# Patient Record
Sex: FEMALE | Race: WHITE | Employment: UNEMPLOYED | URBAN - METROPOLITAN AREA
[De-identification: names, ages, dates, MRNs, and addresses within clinical notes are randomized per-mention and may not be internally consistent; named-entity substitution may affect disease eponyms.]

---

## 2018-04-23 PROBLEM — J30.1 SEASONAL ALLERGIC RHINITIS DUE TO POLLEN: Status: ACTIVE | Noted: 2018-04-23

## 2018-04-23 PROBLEM — F03.90 DEMENTIA WITHOUT BEHAVIORAL DISTURBANCE (HCC): Status: ACTIVE | Noted: 2018-04-23

## 2018-04-23 PROBLEM — E78.00 HYPERCHOLESTEROLEMIA: Status: ACTIVE | Noted: 2018-04-23

## 2018-07-06 PROBLEM — R13.10 DYSPHAGIA: Status: ACTIVE | Noted: 2018-07-06

## 2019-01-03 ENCOUNTER — PATIENT OUTREACH (OUTPATIENT)
Dept: CASE MANAGEMENT | Age: 69
End: 2019-01-03

## 2019-01-03 NOTE — PROGRESS NOTES
MARCELO KSENIA tried to reach out to pt/ her  but the voice mail stated it was pt's daughter number. MARCELO MCCORMACK left a message for Misha Love to return MARCELO MCCORMACK's voice mail. PLAN: 
MARCELO MCCORMACK will attempt to reach pt/ family tomorrow if MARCELO MCCORMACK doesn't hear from them before. This note will not be viewable in [address]

## 2019-01-04 ENCOUNTER — PATIENT OUTREACH (OUTPATIENT)
Dept: CASE MANAGEMENT | Age: 69
End: 2019-01-04

## 2019-01-04 NOTE — PROGRESS NOTES
Ambulatory Care Coordination  Social Work Assessment Referral from: Dr. Iliana Khalil Previously referred? If so, reason and brief outcome No  
Reason for current referral: Community resources Income information (if needed): Not provided at this time Sources of Support: Family/ caregiver ACP set up? Needs information? No, didnt want to talk about Medication Cost assistance needed? Na Referral to CLTC/Medicaid needed? Unsure if pt has Medicaid Referral to Medicare Extra Help/LIS program needed? NA Small home repair needed? NA  
MOW referral? NA Any other concerns/questions? NA Next steps: MARCELO MCCORMACK provided pts daughter with information on the [facility] for [facility] program that could provide personal care MARCELO MCCORMACK asked pt's daughter to check if pt has Medicaid or Medicare to be able to see if pt can qualify for CLTC   
 
 
MARCELO MCCORMACK will follow up in a week This note will not be viewable in [address]

## 2019-01-11 ENCOUNTER — PATIENT OUTREACH (OUTPATIENT)
Dept: CASE MANAGEMENT | Age: 69
End: 2019-01-11

## 2019-01-11 NOTE — PROGRESS NOTES
MARCELO MCCORMACK called pt's daughter Spencer to see if she had been able to find her mom's insurance card to see if she has Medicare or Medicaid. However, MARCELO MCCORMACK got Tati's voice mail and had to leave a message. PLAN: 
MARCELO MCCORMACK will follow back up with pt's daughter in a week if MARCELO MCCORMACK doesn't hear from them before then. This note will not be viewable in [address]

## 2019-01-17 ENCOUNTER — PATIENT OUTREACH (OUTPATIENT)
Dept: CASE MANAGEMENT | Age: 69
End: 2019-01-17

## 2019-01-24 ENCOUNTER — PATIENT OUTREACH (OUTPATIENT)
Dept: CASE MANAGEMENT | Age: 69
End: 2019-01-24

## 2019-01-24 NOTE — PROGRESS NOTES
MARCELO MCCORMACK got a voice mail from pt's daughter stating she has the information needed to see if pt and her  would qualify for Medicaid. MARCELO MCCORMACK tried to call pt's daughter Hong Pulido back to talk about information but got Tati's voice mail. PLAN: 
MARCELO MCCORMACK will follow up with pt's daughter in a week if MARCELO MCCORMACK doesn't hear from her before then. This note will not be viewable in [address]

## 2019-01-30 ENCOUNTER — PATIENT OUTREACH (OUTPATIENT)
Dept: CASE MANAGEMENT | Age: 69
End: 2019-01-30

## 2019-01-30 NOTE — PROGRESS NOTES
MARCELO MCCORMACK called and spoke with pt's daughter Mauricio August. Mauricio August and MARCELO MCCORMACK talked more in depth about doing a Medicaid application for her mom to see if she would qualify for extra support in the home. After talking Mauricio August stated that she would like for MARCELO MCCORMACK to mail her out a Medicaid application instead of having MARCELO MCCORMACK come out and help for right now. Mauricio August did want to make sure that if she needs help down the line that MARCELO MCCORMACK would still be able to come out. MARCELO MCCORMACK assured her that MARCELO MCCORMACK would come out to the home if that's what they needed. PLAN: 
MARCELO MCCORMACK will mail out a Medicaid application for pt and follow up with her daughter in two weeks. This note will not be viewable in [address]

## 2019-02-13 ENCOUNTER — PATIENT OUTREACH (OUTPATIENT)
Dept: CASE MANAGEMENT | Age: 69
End: 2019-02-13

## 2019-02-13 NOTE — PROGRESS NOTES
MARCELO MCCORMACK called and spoke with pt's daughter Ana Braden who said that she got the information in the mail but she hasn't had a chance to look over the information. Ana Braden asked if MARCELO MCCORMACK would mind giving her two weeks to look over things and call back. PLAN: 
MARCELO MCCORMACK will follow up with pt's daughter Ana Braden in two weeks. This note will not be viewable in [address]

## 2019-02-27 ENCOUNTER — PATIENT OUTREACH (OUTPATIENT)
Dept: CASE MANAGEMENT | Age: 69
End: 2019-02-27

## 2019-02-27 NOTE — PROGRESS NOTES
MARCELO MCCORMACK called to follow up with pt's daughter to see how things age going. However, MARCELO MCCORMACK got Tati's voice mail and had to leave a message. PLAN: 
MARCELO MCCORMACK will attempt to reach Colbert Quails again in a week if MARCELO MCCORMACK doesn't hear from her before then. This note will not be viewable in [address]

## 2019-03-06 ENCOUNTER — PATIENT OUTREACH (OUTPATIENT)
Dept: CASE MANAGEMENT | Age: 69
End: 2019-03-06

## 2019-03-06 NOTE — PROGRESS NOTES
MARCELO MCCORMACK called to follow up with pt's daughter but got her voice mail. MARCELO MCCORMACK left a message for pt's daughter to call MARCELO MCCORMACK back. PLAN:  MARCELO MCCORMACK will attempt to reach pt's daughter again in a week if MARCELO MCCORMACK doesn't hear from her before then. This note will not be viewable in [address]
Problem: Fluid Volume - Risk of, Imbalance:  Goal: Absence of imbalanced fluid volume signs and symptoms  Absence of imbalanced fluid volume signs and symptoms   Outcome: Ongoing      Problem: Pain - Acute:  Goal: Pain level will decrease  Pain level will decrease    Outcome: Ongoing      Problem: Skin Integrity - Risk of, Impaired:  Goal: Absence of pressure ulcer  Absence of pressure ulcer   Outcome: Met This Shift      Problem: Anxiety/Stress:  Goal: No signs of behavioral stress  No signs of behavioral stress   Outcome: Ongoing      Problem: Mental Status - Impaired:  Goal: Absence of continued neurological deterioration signs and symptoms  Absence of continued neurological deterioration signs and symptoms   Outcome: Ongoing      Problem: Nutrition Deficit:  Goal: Ability to achieve adequate nutritional intake will improve  Ability to achieve adequate nutritional intake will improve   Outcome: Ongoing      Problem: Pain:  Goal: Pain level will decrease  Pain level will decrease    Outcome: Ongoing
chronic pain  Control of chronic pain   Outcome: Ongoing    Goal: Pain level will decrease  Pain level will decrease    Outcome: Ongoing      Problem: Neurological  Goal: Maximum potential motor/sensory/cognitive function  Outcome: Ongoing
chronic pain  Control of chronic pain   Outcome: Ongoing    Goal: Pain level will decrease  Pain level will decrease    Outcome: Ongoing      Problem: Neurological  Goal: Maximum potential motor/sensory/cognitive function  Outcome: Ongoing

## 2019-03-13 ENCOUNTER — PATIENT OUTREACH (OUTPATIENT)
Dept: CASE MANAGEMENT | Age: 69
End: 2019-03-13

## 2019-03-13 NOTE — PROGRESS NOTES
MARCELO MCCORMACK and Kirti Baugh talked about the Medicaid application and she stated that she did have some questions about the application but her sister in law is supposed to be coming over this evening to help her with the application and that she would call MARCELO MCCORMACK after today if they have any more questions. PLAN:  MARCELO MCCORMACK will follow up with pt's daughter in a week to see if they were able to fill out Medicaid application      This note will not be viewable in [address]

## 2019-03-20 ENCOUNTER — PATIENT OUTREACH (OUTPATIENT)
Dept: CASE MANAGEMENT | Age: 69
End: 2019-03-20

## 2019-03-20 NOTE — PROGRESS NOTES
MARCELO MCCORMACK called pt's daughter to follow up and see how things are going. However, MARCELO MCCORMACK got Tati's voice mail and had to leave a message. PLAN: 
MARCELO MCCORMACK will reach out to pt's daughter Kamar Gil again next week if MARCELO MCCORMACK doesn't hear from her before then. This note will not be viewable in [address]

## 2019-03-27 ENCOUNTER — PATIENT OUTREACH (OUTPATIENT)
Dept: CASE MANAGEMENT | Age: 69
End: 2019-03-27

## 2019-03-27 NOTE — PROGRESS NOTES
MARCELO MCCORMACK called and spoke with pt's daughter who said that she's been able to turn in the [employer] application. She stated that they sent out a letter requesting additional information and they have also turned in that information. MARCELO MCCORMACK let pt's daughter know it can take some time to hear back from [employer]. She was able to verbalize understanding. MARCELO MCCORMACK did let pt's daughter know in a month we can call together and check pt's Medicaid status if they haven't already heard something, so long as pt's daughter filled out the authorized rep form and she said she had done that. PLAN: 
MARCELO MCCORMACK will follow up with pt's daughter in a month to help her check pt's Medicaid status if they haven't already heard back. This note will not be viewable in [address]

## 2019-04-29 ENCOUNTER — PATIENT OUTREACH (OUTPATIENT)
Dept: CASE MANAGEMENT | Age: 69
End: 2019-04-29

## 2019-04-29 NOTE — PROGRESS NOTES
MARCELO MCCORMACK called to follow up with pt's daughter to see how things are going and if there are any further MARCELO MCCORMACK needs. However, MARCELO MCCORMACK got pt's daughter voice mail and had to leave a message. PLAN: 
MARCELO MCCORMACK will follow up in a week if MARCEOL MCCOMRACK doesn't hear from pt's family before then. This note will not be viewable in [address]

## 2019-05-09 ENCOUNTER — PATIENT OUTREACH (OUTPATIENT)
Dept: CASE MANAGEMENT | Age: 69
End: 2019-05-09

## 2019-05-09 NOTE — PROGRESS NOTES
MARCELO MCCORMACK called to follow up with pt's daughter but got her voice mail and had to leave a message. PLAN: 
MARCELO MCCORMACK will attempt to reach pt again in a week if MARCELO MCCORMACK doesn't hear from them before then. This note will not be viewable in [address]

## 2019-05-16 ENCOUNTER — PATIENT OUTREACH (OUTPATIENT)
Dept: CASE MANAGEMENT | Age: 69
End: 2019-05-16

## 2019-05-16 NOTE — PROGRESS NOTES
MARCELO MCCORMACK called pt's daughter again to try and touch base and see if they've been able to get Medicaid in place for pt. However, MARCELO MCCORMACK got Tati's voice mail and had to leave a message. PLAN: 
MARCELO MCCORMACK will close CCM episode at this time and remove herself from pt's care team. 
 
 
This note will not be viewable in [address]